# Patient Record
Sex: MALE | Race: BLACK OR AFRICAN AMERICAN | NOT HISPANIC OR LATINO | URBAN - METROPOLITAN AREA
[De-identification: names, ages, dates, MRNs, and addresses within clinical notes are randomized per-mention and may not be internally consistent; named-entity substitution may affect disease eponyms.]

---

## 2020-07-13 ENCOUNTER — OFFICE VISIT (OUTPATIENT)
Dept: URBAN - METROPOLITAN AREA CLINIC 17 | Facility: CLINIC | Age: 85
End: 2020-07-13
Payer: MEDICARE

## 2020-07-13 DIAGNOSIS — K94.23 GASTROSTOMY TUBE DYSFUNCTION: ICD-10-CM

## 2020-07-13 DIAGNOSIS — A04.72 C. DIFFICILE DIARRHEA: ICD-10-CM

## 2020-07-13 DIAGNOSIS — C61 PROSTATE CANCER: ICD-10-CM

## 2020-07-13 DIAGNOSIS — C79.82 METASTATIC ADENOCARCINOMA TO PROSTATE: ICD-10-CM

## 2020-07-13 PROBLEM — 66108005: Status: ACTIVE | Noted: 2020-07-13

## 2020-07-13 PROBLEM — 119292006: Status: ACTIVE | Noted: 2020-07-13

## 2020-07-13 PROBLEM — 399068003: Status: ACTIVE | Noted: 2020-07-13

## 2020-07-13 PROBLEM — 71457002: Status: ACTIVE | Noted: 2020-07-13

## 2020-07-13 PROBLEM — 426032000: Status: ACTIVE | Noted: 2020-07-13

## 2020-07-13 PROBLEM — 94503003: Status: ACTIVE | Noted: 2020-07-13

## 2020-07-13 PROCEDURE — 99204 OFFICE O/P NEW MOD 45 MIN: CPT | Performed by: INTERNAL MEDICINE

## 2020-07-13 RX ORDER — MEMANTINE HYDROCHLORIDE 10 MG/1
1 TABLET TABLET ORAL
Status: ACTIVE | COMMUNITY

## 2020-07-13 RX ORDER — TAMSULOSIN HYDROCHLORIDE 0.4 MG/1
1 CAPSULE CAPSULE ORAL ONCE A DAY
Status: ACTIVE | COMMUNITY

## 2020-07-13 RX ORDER — PRAVASTATIN SODIUM 20 MG/1
1 TABLET TABLET ORAL ONCE A DAY
Status: ACTIVE | COMMUNITY

## 2020-07-13 RX ORDER — LOSARTAN POTASSIUM 25 MG/1
1 TABLET TABLET ORAL ONCE A DAY
Status: ACTIVE | COMMUNITY

## 2020-07-13 RX ORDER — HYDROCHLOROTHIAZIDE 12.5 MG/1
1 TABLET IN THE MORNING TABLET ORAL ONCE A DAY
Status: ACTIVE | COMMUNITY

## 2020-07-13 NOTE — HPI-OTHER HISTORIES
85 y/o black male with multiple problems including severe sepsis, metastatic prostate cancer, acute metabolic encephalopathy, acute kidney injury and dementia who presents for evaluation of c.diff infection and G tube evaluation. Pt is not able to eat by mouth due to aspiration risk. He is totally dependent upon TF for nutriitonal support. Pt was previously at [facility] following  a prolonged hospital stay at [facility] for 11 days in April in 2020.

## 2020-07-13 NOTE — PHYSICAL EXAM GASTROINTESTINAL
Abdomen , soft, nontender, nondistended , no guarding or rigidity , no masses palpable , normal bowel sounds , Liver and Spleen , no hepatomegaly present , no hepatosplenomegaly , liver nontender , spleen not palpable. G tube looks good but buttress is too tight,

## 2020-07-20 ENCOUNTER — TELEPHONE ENCOUNTER (OUTPATIENT)
Dept: URBAN - METROPOLITAN AREA CLINIC 92 | Facility: CLINIC | Age: 85
End: 2020-07-20

## 2020-07-20 LAB
C. DIFFICILE CHEK (GDH), STOOL - QDX: POSITIVE
C. DIFFICILE TOXIN A/B, STOOL - QDX: NEGATIVE
GASTROINTESTINAL PATHOGEN: (no result)

## 2020-07-20 RX ORDER — VANCOMYCIN HYDROCHLORIDE 250 MG/5ML
5 ML POWDER, FOR SOLUTION ORAL
Qty: 600 ML | Refills: 1 | OUTPATIENT
Start: 2020-07-20 | End: 2020-09-18

## 2020-09-11 ENCOUNTER — DASHBOARD ENCOUNTERS (OUTPATIENT)
Age: 85
End: 2020-09-11

## 2020-09-11 ENCOUNTER — OFFICE VISIT (OUTPATIENT)
Dept: URBAN - METROPOLITAN AREA TELEHEALTH 2 | Facility: TELEHEALTH | Age: 85
End: 2020-09-11
Payer: MEDICARE

## 2020-09-11 DIAGNOSIS — R54 ADVANCED AGE: ICD-10-CM

## 2020-09-11 DIAGNOSIS — K52.9 CHRONIC DIARRHEA: ICD-10-CM

## 2020-09-11 DIAGNOSIS — A04.72 C. DIFFICILE DIARRHEA: ICD-10-CM

## 2020-09-11 DIAGNOSIS — E55.9 VITAMIN D DEFICIENCY DISEASE: ICD-10-CM

## 2020-09-11 DIAGNOSIS — G31.83 LEWY BODY PARKINSON DISEASE: ICD-10-CM

## 2020-09-11 PROBLEM — 34713006: Status: ACTIVE | Noted: 2020-07-13

## 2020-09-11 PROBLEM — 80098002: Status: ACTIVE | Noted: 2020-09-11

## 2020-09-11 PROBLEM — 49808004: Status: ACTIVE | Noted: 2020-09-11

## 2020-09-11 PROBLEM — 236071009: Status: ACTIVE | Noted: 2020-09-11

## 2020-09-11 PROBLEM — 5891000119102: Status: ACTIVE | Noted: 2020-07-13

## 2020-09-11 PROCEDURE — 99214 OFFICE O/P EST MOD 30 MIN: CPT | Performed by: INTERNAL MEDICINE

## 2020-09-11 RX ORDER — VANCOMYCIN HYDROCHLORIDE 250 MG/5ML
5 ML POWDER, FOR SOLUTION ORAL
Qty: 600 ML | Refills: 1 | Status: ACTIVE | COMMUNITY
Start: 2020-07-20 | End: 2020-09-18

## 2020-09-11 RX ORDER — LOSARTAN POTASSIUM 25 MG/1
1 TABLET TABLET ORAL ONCE A DAY
Status: ACTIVE | COMMUNITY

## 2020-09-11 RX ORDER — TAMSULOSIN HYDROCHLORIDE 0.4 MG/1
1 CAPSULE CAPSULE ORAL ONCE A DAY
Status: ACTIVE | COMMUNITY

## 2020-09-11 RX ORDER — HYDROCHLOROTHIAZIDE 12.5 MG/1
1 TABLET IN THE MORNING TABLET ORAL ONCE A DAY
Status: ACTIVE | COMMUNITY

## 2020-09-11 RX ORDER — PRAVASTATIN SODIUM 20 MG/1
1 TABLET TABLET ORAL ONCE A DAY
Status: ACTIVE | COMMUNITY

## 2020-09-11 RX ORDER — MEMANTINE HYDROCHLORIDE 10 MG/1
1 TABLET TABLET ORAL
Status: ACTIVE | COMMUNITY

## 2020-09-11 NOTE — HPI-OTHER HISTORIES
The pt has a history of prostate cancer, and history of Lewy body dementia and parkinson's disease who now presents for a f/u office visit. Of note, the pt is eating soft foods under the auspices of speech therapy and he also drinking small amounts of liquids and he has a G tube for enteral feedings. Of note, the pt continues on vancomycin for c. diff toxin positivity. He will a f/u stool study done.